# Patient Record
Sex: FEMALE | Race: OTHER | NOT HISPANIC OR LATINO | URBAN - METROPOLITAN AREA
[De-identification: names, ages, dates, MRNs, and addresses within clinical notes are randomized per-mention and may not be internally consistent; named-entity substitution may affect disease eponyms.]

---

## 2018-10-08 ENCOUNTER — EMERGENCY (EMERGENCY)
Facility: HOSPITAL | Age: 38
LOS: 1 days | Discharge: HOME | End: 2018-10-08
Admitting: EMERGENCY MEDICINE
Payer: COMMERCIAL

## 2018-10-08 VITALS
RESPIRATION RATE: 16 BRPM | WEIGHT: 122.8 LBS | OXYGEN SATURATION: 100 % | TEMPERATURE: 98 F | DIASTOLIC BLOOD PRESSURE: 64 MMHG | HEART RATE: 76 BPM | HEIGHT: 62 IN | SYSTOLIC BLOOD PRESSURE: 115 MMHG

## 2018-10-08 DIAGNOSIS — M54.5 LOW BACK PAIN: ICD-10-CM

## 2018-10-08 PROCEDURE — 99283 EMERGENCY DEPT VISIT LOW MDM: CPT | Mod: 25

## 2018-10-08 PROCEDURE — 99283 EMERGENCY DEPT VISIT LOW MDM: CPT

## 2018-10-08 PROCEDURE — 96372 THER/PROPH/DIAG INJ SC/IM: CPT

## 2018-10-08 RX ORDER — KETOROLAC TROMETHAMINE 30 MG/ML
30 SYRINGE (ML) INJECTION ONCE
Qty: 0 | Refills: 0 | Status: DISCONTINUED | OUTPATIENT
Start: 2018-10-08 | End: 2018-10-08

## 2018-10-08 RX ORDER — MELOXICAM 15 MG/1
1 TABLET ORAL
Qty: 14 | Refills: 0 | OUTPATIENT
Start: 2018-10-08 | End: 2018-10-21

## 2018-10-08 RX ORDER — DIAZEPAM 5 MG
5 TABLET ORAL ONCE
Qty: 0 | Refills: 0 | Status: DISCONTINUED | OUTPATIENT
Start: 2018-10-08 | End: 2018-10-08

## 2018-10-08 RX ORDER — CYCLOBENZAPRINE HYDROCHLORIDE 10 MG/1
1 TABLET, FILM COATED ORAL
Qty: 42 | Refills: 0 | OUTPATIENT
Start: 2018-10-08 | End: 2018-10-21

## 2018-10-08 RX ADMIN — Medication 5 MILLIGRAM(S): at 19:55

## 2018-10-08 RX ADMIN — Medication 30 MILLIGRAM(S): at 19:55

## 2018-10-08 NOTE — ED PROVIDER NOTE - MEDICAL DECISION MAKING DETAILS
38 y/o female with lower back pain x1 week. Neuro intact. Good rom, ambulating without gait. no saddle anesthesia, no step-offs/injury. skin unremarkable. pain +ttp on right. likely muscular strain. will give pain medication. advised ortho fu. Patient is 18 years or older

## 2018-10-08 NOTE — ED PROVIDER NOTE - OBJECTIVE STATEMENT
38 y/o female with a PMHx of chronic lower back pain is present with reoccurring atraumatic lower back pain x1 week. Pt reports pain is located on the lower right side with radiation of pain down the back of her right legs. She describes an aching type of discomfort that increases with sitting. She has been taking naproxen/motrin for the pain without relief of her symptoms. She denies the following: injury, loss of urine/bm, saddle anesthesia, numbness/tingling, fever, chills, hx of spinal injections, hx of cancer, blood in urine.

## 2018-10-08 NOTE — ED ADULT TRIAGE NOTE - CHIEF COMPLAINT QUOTE
c/o lower back pain radiation to right buttocks and RLE x 2 weeks ago. Denies incontinence or paresthesia

## 2018-10-08 NOTE — ED PROVIDER NOTE - NS ED MD DISPO DISCHARGE CCDA
Patient/Caregiver provided printed discharge information. Spine appears normal, range of motion is not limited, no muscle or joint tenderness

## 2018-10-08 NOTE — ED ADULT NURSE NOTE - OBJECTIVE STATEMENT
37y F, A&ox3, presents to ed for right lower back radiating to right leg pain x2 weeks. no new injury, no incontinence, no numbness nor tingling, no cp, no sob. no fever nor chills. NAD. Will continue to monitor.

## 2018-10-08 NOTE — ED ADULT NURSE NOTE - NSIMPLEMENTINTERV_GEN_ALL_ED
Implemented All Universal Safety Interventions:  [place] to call system. Call bell, personal items and telephone within reach. Instruct patient to call for assistance. Room bathroom lighting operational. Non-slip footwear when patient is off stretcher. Physically safe environment: no spills, clutter or unnecessary equipment. Stretcher in lowest position, wheels locked, appropriate side rails in place.

## 2021-02-19 ENCOUNTER — RESULT REVIEW (OUTPATIENT)
Age: 41
End: 2021-02-19
